# Patient Record
Sex: FEMALE | Race: OTHER | Employment: FULL TIME | URBAN - METROPOLITAN AREA
[De-identification: names, ages, dates, MRNs, and addresses within clinical notes are randomized per-mention and may not be internally consistent; named-entity substitution may affect disease eponyms.]

---

## 2020-02-20 ENCOUNTER — HOSPITAL ENCOUNTER (EMERGENCY)
Age: 20
Discharge: HOME | End: 2020-02-21
Attending: PEDIATRICS
Payer: COMMERCIAL

## 2020-02-20 DIAGNOSIS — K52.9 AGE (ACUTE GASTROENTERITIS): Primary | ICD-10-CM

## 2020-02-20 PROCEDURE — 85652 RBC SED RATE AUTOMATED: CPT

## 2020-02-20 PROCEDURE — 99284 EMERGENCY DEPT VISIT MOD MDM: CPT

## 2020-02-20 PROCEDURE — 86140 C-REACTIVE PROTEIN: CPT

## 2020-02-20 PROCEDURE — 81001 URINALYSIS AUTO W/SCOPE: CPT

## 2020-02-20 PROCEDURE — 87506 IADNA-DNA/RNA PROBE TQ 6-11: CPT

## 2020-02-20 PROCEDURE — 81025 URINE PREGNANCY TEST: CPT

## 2020-02-20 PROCEDURE — 96375 TX/PRO/DX INJ NEW DRUG ADDON: CPT

## 2020-02-20 PROCEDURE — 83690 ASSAY OF LIPASE: CPT

## 2020-02-20 PROCEDURE — 96361 HYDRATE IV INFUSION ADD-ON: CPT

## 2020-02-20 PROCEDURE — 85025 COMPLETE CBC W/AUTO DIFF WBC: CPT

## 2020-02-20 PROCEDURE — 96374 THER/PROPH/DIAG INJ IV PUSH: CPT

## 2020-02-20 PROCEDURE — 36415 COLL VENOUS BLD VENIPUNCTURE: CPT

## 2020-02-20 PROCEDURE — 80053 COMPREHEN METABOLIC PANEL: CPT

## 2020-02-20 RX ORDER — ONDANSETRON 2 MG/ML
4 INJECTION INTRAMUSCULAR; INTRAVENOUS
Status: COMPLETED | OUTPATIENT
Start: 2020-02-21 | End: 2020-02-21

## 2020-02-21 ENCOUNTER — APPOINTMENT (OUTPATIENT)
Dept: CT IMAGING | Age: 20
End: 2020-02-21
Attending: PEDIATRICS
Payer: COMMERCIAL

## 2020-02-21 ENCOUNTER — APPOINTMENT (OUTPATIENT)
Dept: GENERAL RADIOLOGY | Age: 20
End: 2020-02-21
Attending: PEDIATRICS
Payer: COMMERCIAL

## 2020-02-21 VITALS
SYSTOLIC BLOOD PRESSURE: 102 MMHG | OXYGEN SATURATION: 98 % | RESPIRATION RATE: 18 BRPM | HEART RATE: 96 BPM | WEIGHT: 269.62 LBS | TEMPERATURE: 98.2 F | DIASTOLIC BLOOD PRESSURE: 70 MMHG

## 2020-02-21 LAB
ALBUMIN SERPL-MCNC: 3.7 G/DL (ref 3.5–5)
ALBUMIN/GLOB SERPL: 0.8 {RATIO} (ref 1.1–2.2)
ALP SERPL-CCNC: 54 U/L (ref 45–117)
ALT SERPL-CCNC: 26 U/L (ref 12–78)
ANION GAP SERPL CALC-SCNC: 7 MMOL/L (ref 5–15)
APPEARANCE UR: CLEAR
AST SERPL-CCNC: 14 U/L (ref 15–37)
BACTERIA URNS QL MICRO: NEGATIVE /HPF
BASOPHILS # BLD: 0 K/UL (ref 0–0.1)
BASOPHILS NFR BLD: 0 % (ref 0–1)
BILIRUB SERPL-MCNC: 0.7 MG/DL (ref 0.2–1)
BILIRUB UR QL: NEGATIVE
BUN SERPL-MCNC: 9 MG/DL (ref 6–20)
BUN/CREAT SERPL: 11 (ref 12–20)
CALCIUM SERPL-MCNC: 8.9 MG/DL (ref 8.5–10.1)
CAMPYLOBACTER SPECIES, DNA: NEGATIVE
CHLORIDE SERPL-SCNC: 105 MMOL/L (ref 97–108)
CO2 SERPL-SCNC: 22 MMOL/L (ref 21–32)
COLOR UR: ABNORMAL
CREAT SERPL-MCNC: 0.79 MG/DL (ref 0.55–1.02)
CRP SERPL-MCNC: 11.4 MG/DL (ref 0–0.6)
DIFFERENTIAL METHOD BLD: ABNORMAL
ENTEROTOXIGEN E COLI, DNA: NEGATIVE
EOSINOPHIL # BLD: 0 K/UL (ref 0–0.4)
EOSINOPHIL NFR BLD: 0 % (ref 0–7)
EPITH CASTS URNS QL MICRO: ABNORMAL /LPF
ERYTHROCYTE [DISTWIDTH] IN BLOOD BY AUTOMATED COUNT: 14.3 % (ref 11.5–14.5)
ERYTHROCYTE [SEDIMENTATION RATE] IN BLOOD: 53 MM/HR (ref 0–20)
GLOBULIN SER CALC-MCNC: 4.7 G/DL (ref 2–4)
GLUCOSE SERPL-MCNC: 135 MG/DL (ref 65–100)
GLUCOSE UR STRIP.AUTO-MCNC: NEGATIVE MG/DL
HCG UR QL: NEGATIVE
HCG UR QL: NEGATIVE
HCT VFR BLD AUTO: 41.6 % (ref 35–47)
HGB BLD-MCNC: 13 G/DL (ref 11.5–16)
HGB UR QL STRIP: ABNORMAL
HYALINE CASTS URNS QL MICRO: ABNORMAL /LPF (ref 0–5)
IMM GRANULOCYTES # BLD AUTO: 0 K/UL (ref 0–0.04)
IMM GRANULOCYTES NFR BLD AUTO: 0 % (ref 0–0.5)
KETONES UR QL STRIP.AUTO: ABNORMAL MG/DL
LEUKOCYTE ESTERASE UR QL STRIP.AUTO: NEGATIVE
LIPASE SERPL-CCNC: 111 U/L (ref 73–393)
LYMPHOCYTES # BLD: 1.6 K/UL (ref 0.8–3.5)
LYMPHOCYTES NFR BLD: 15 % (ref 12–49)
MCH RBC QN AUTO: 23.6 PG (ref 26–34)
MCHC RBC AUTO-ENTMCNC: 31.3 G/DL (ref 30–36.5)
MCV RBC AUTO: 75.4 FL (ref 80–99)
MONOCYTES # BLD: 0.7 K/UL (ref 0–1)
MONOCYTES NFR BLD: 7 % (ref 5–13)
NEUTS SEG # BLD: 8.1 K/UL (ref 1.8–8)
NEUTS SEG NFR BLD: 78 % (ref 32–75)
NITRITE UR QL STRIP.AUTO: NEGATIVE
NRBC # BLD: 0 K/UL (ref 0–0.01)
NRBC BLD-RTO: 0 PER 100 WBC
P SHIGELLOIDES DNA STL QL NAA+PROBE: NEGATIVE
PH UR STRIP: 5.5 [PH] (ref 5–8)
PLATELET # BLD AUTO: 317 K/UL (ref 150–400)
PMV BLD AUTO: 10.1 FL (ref 8.9–12.9)
POTASSIUM SERPL-SCNC: 3.2 MMOL/L (ref 3.5–5.1)
PROT SERPL-MCNC: 8.4 G/DL (ref 6.4–8.2)
PROT UR STRIP-MCNC: ABNORMAL MG/DL
RBC # BLD AUTO: 5.52 M/UL (ref 3.8–5.2)
RBC #/AREA URNS HPF: ABNORMAL /HPF (ref 0–5)
SALMONELLA SPECIES, DNA: NEGATIVE
SHIGA TOXIN PRODUCING, DNA: NEGATIVE
SHIGELLA SP+EIEC IPAH STL QL NAA+PROBE: NEGATIVE
SODIUM SERPL-SCNC: 134 MMOL/L (ref 136–145)
SP GR UR REFRACTOMETRY: 1.02 (ref 1–1.03)
UR CULT HOLD, URHOLD: NORMAL
UROBILINOGEN UR QL STRIP.AUTO: 0.2 EU/DL (ref 0.2–1)
VIBRIO SPECIES, DNA: NEGATIVE
WBC # BLD AUTO: 10.5 K/UL (ref 3.6–11)
WBC URNS QL MICRO: ABNORMAL /HPF (ref 0–4)
Y. ENTEROCOLITICA, DNA: NEGATIVE

## 2020-02-21 PROCEDURE — 74011000258 HC RX REV CODE- 258: Performed by: RADIOLOGY

## 2020-02-21 PROCEDURE — 74011636320 HC RX REV CODE- 636/320: Performed by: RADIOLOGY

## 2020-02-21 PROCEDURE — 74177 CT ABD & PELVIS W/CONTRAST: CPT

## 2020-02-21 PROCEDURE — 74019 RADEX ABDOMEN 2 VIEWS: CPT

## 2020-02-21 PROCEDURE — 74011250636 HC RX REV CODE- 250/636: Performed by: PEDIATRICS

## 2020-02-21 PROCEDURE — 74011000250 HC RX REV CODE- 250: Performed by: PEDIATRICS

## 2020-02-21 PROCEDURE — 74011250637 HC RX REV CODE- 250/637: Performed by: PEDIATRICS

## 2020-02-21 PROCEDURE — C9113 INJ PANTOPRAZOLE SODIUM, VIA: HCPCS | Performed by: PEDIATRICS

## 2020-02-21 RX ORDER — SODIUM CHLORIDE 0.9 % (FLUSH) 0.9 %
10 SYRINGE (ML) INJECTION
Status: COMPLETED | OUTPATIENT
Start: 2020-02-21 | End: 2020-02-21

## 2020-02-21 RX ORDER — ACETAMINOPHEN 325 MG/1
650 TABLET ORAL
Status: COMPLETED | OUTPATIENT
Start: 2020-02-21 | End: 2020-02-21

## 2020-02-21 RX ORDER — LOPERAMIDE HYDROCHLORIDE 2 MG/1
2 CAPSULE ORAL
Qty: 20 CAP | Refills: 0 | Status: SHIPPED | OUTPATIENT
Start: 2020-02-21 | End: 2020-03-02

## 2020-02-21 RX ORDER — ONDANSETRON 4 MG/1
4 TABLET, ORALLY DISINTEGRATING ORAL
Qty: 10 TAB | Refills: 0 | Status: SHIPPED | OUTPATIENT
Start: 2020-02-21 | End: 2021-02-15

## 2020-02-21 RX ADMIN — ACETAMINOPHEN 650 MG: 325 TABLET ORAL at 00:44

## 2020-02-21 RX ADMIN — IOHEXOL 50 ML: 240 INJECTION, SOLUTION INTRATHECAL; INTRAVASCULAR; INTRAVENOUS; ORAL at 02:00

## 2020-02-21 RX ADMIN — SODIUM CHLORIDE 1000 ML: 900 INJECTION, SOLUTION INTRAVENOUS at 00:07

## 2020-02-21 RX ADMIN — SODIUM CHLORIDE 100 ML: 900 INJECTION, SOLUTION INTRAVENOUS at 02:56

## 2020-02-21 RX ADMIN — Medication 10 ML: at 02:56

## 2020-02-21 RX ADMIN — IOPAMIDOL 100 ML: 755 INJECTION, SOLUTION INTRAVENOUS at 02:56

## 2020-02-21 RX ADMIN — ONDANSETRON 4 MG: 2 INJECTION, SOLUTION INTRAMUSCULAR; INTRAVENOUS at 00:44

## 2020-02-21 RX ADMIN — SODIUM CHLORIDE 40 MG: 9 INJECTION INTRAMUSCULAR; INTRAVENOUS; SUBCUTANEOUS at 00:44

## 2020-02-21 NOTE — ED TRIAGE NOTES
Pt states she has had watery diarrhea X2 days. Vomiting yesterday. Pt states she has been having chills but no documented fever.

## 2020-02-21 NOTE — DISCHARGE INSTRUCTIONS
Gastroenteritis: Care Instructions  Your Care Instructions    Gastroenteritis is an illness that may cause nausea, vomiting, and diarrhea. It is sometimes called \"stomach flu. \" It can be caused by bacteria or a virus. You will probably begin to feel better in 1 to 2 days. In the meantime, get plenty of rest and make sure you do not become dehydrated. Dehydration occurs when your body loses too much fluid. Follow-up care is a key part of your treatment and safety. Be sure to make and go to all appointments, and call your doctor if you are having problems. It's also a good idea to know your test results and keep a list of the medicines you take. How can you care for yourself at home? · If your doctor prescribed antibiotics, take them as directed. Do not stop taking them just because you feel better. You need to take the full course of antibiotics. · Drink plenty of fluids to prevent dehydration, enough so that your urine is light yellow or clear like water. Choose water and other caffeine-free clear liquids until you feel better. If you have kidney, heart, or liver disease and have to limit fluids, talk with your doctor before you increase your fluid intake. · Drink fluids slowly, in frequent, small amounts, because drinking too much too fast can cause vomiting. · Begin eating mild foods, such as dry toast, yogurt, applesauce, bananas, and rice. Avoid spicy, hot, or high-fat foods, and do not drink alcohol or caffeine for a day or two. Do not drink milk or eat ice cream until you are feeling better. How to prevent gastroenteritis  · Keep hot foods hot and cold foods cold. · Do not eat meats, dressings, salads, or other foods that have been kept at room temperature for more than 2 hours. · Use a thermometer to check your refrigerator. It should be between 34°F and 40°F.  · Defrost meats in the refrigerator or microwave, not on the kitchen counter. · Keep your hands and your kitchen clean.  Wash your hands, cutting boards, and countertops with hot soapy water frequently. · Cook meat until it is well done. · Do not eat raw eggs or uncooked sauces made with raw eggs. · Do not take chances. If food looks or tastes spoiled, throw it out. When should you call for help? Call 911 anytime you think you may need emergency care. For example, call if:    · You vomit blood or what looks like coffee grounds.     · You passed out (lost consciousness).     · You pass maroon or very bloody stools.    Call your doctor now or seek immediate medical care if:    · You have severe belly pain.     · You have signs of needing more fluids. You have sunken eyes, a dry mouth, and pass only a little dark urine.     · You feel like you are going to faint.     · You have increased belly pain that does not go away in 1 to 2 days.     · You have new or increased nausea, or you are vomiting.     · You have a new or higher fever.     · Your stools are black and tarlike or have streaks of blood.    Watch closely for changes in your health, and be sure to contact your doctor if:    · You are dizzy or lightheaded.     · You urinate less than usual, or your urine is dark yellow or brown.     · You do not feel better with each day that goes by. Where can you learn more? Go to http://constance-martir.info/. Enter N142 in the search box to learn more about \"Gastroenteritis: Care Instructions. \"  Current as of: June 9, 2019  Content Version: 12.2  © 8797-6209 [employer]. Care instructions adapted under license by [employer] (which disclaims liability or warranty for this information). If you have questions about a medical condition or this instruction, always ask your healthcare professional. [address] any warranty or liability for your use of this information.

## 2020-02-21 NOTE — ED NOTES
Discharge instructions provided, pt verbalizes understanding. Tolerated PO. Reports feeling better. Stressed importance of handwashing with soap and water pt and father.

## 2020-02-21 NOTE — ED PROVIDER NOTES
The history is provided by the patient. Diarrhea    This is a new problem. The problem occurs constantly. The problem has been rapidly worsening (16 times today. Light green/brown and watery. no blood). The pain is associated with vomiting (Vomit x3-5 a day. NBNB). The pain is located in the epigastric region and generalized abdominal region. The quality of the pain is shooting and burning. The pain is moderate. Associated symptoms include anorexia, belching, diarrhea, flatus, nausea, vomiting and headaches (Hx of migraines and this feels like throbbing in frontal area). Pertinent negatives include no fever (Chills howeveer), no hematochezia, no melena, no constipation, no dysuria, no frequency, no hematuria, no myalgias, no chest pain and no back pain. Nothing worsens the pain. The pain is relieved by nothing. [facility] 6 weeks ago. NO other travel. NO vaginal complaints.  NO change in meds    IMM UTD  Past Medical History:   Diagnosis Date    Anxiety     Depression     Pre-diabetes        Past Surgical History:   Procedure Laterality Date    HX ADENOIDECTOMY      HX TONSIL AND ADENOIDECTOMY      HX TONSILLECTOMY           Family History:   Problem Relation Age of Onset    Hypertension Father     Diabetes Father         type 2    Diabetes Paternal Grandfather        Social History     Socioeconomic History    Marital status: SINGLE     Spouse name: Not on file    Number of children: Not on file    Years of education: Not on file    Highest education level: Not on file   Occupational History    Not on file   Social Needs    Financial resource strain: Not on file    Food insecurity:     Worry: Not on file     Inability: Not on file    Transportation needs:     Medical: Not on file     Non-medical: Not on file   Tobacco Use    Smoking status: Never Smoker    Smokeless tobacco: Never Used   Substance and Sexual Activity    Alcohol use: No    Drug use: No    Sexual activity: Never   Lifestyle  Physical activity:     Days per week: Not on file     Minutes per session: Not on file    Stress: Not on file   Relationships    Social connections:     Talks on phone: Not on file     Gets together: Not on file     Attends Evangelical service: Not on file     Active member of club or organization: Not on file     Attends meetings of clubs or organizations: Not on file     Relationship status: Not on file    Intimate partner violence:     Fear of current or ex partner: Not on file     Emotionally abused: Not on file     Physically abused: Not on file     Forced sexual activity: Not on file   Other Topics Concern    Not on file   Social History Narrative    ** Merged History Encounter **              ALLERGIES: Other medication    Review of Systems   Constitutional: Positive for chills. Negative for fever (Chills howeveer). HENT: Negative for congestion, ear pain, rhinorrhea, sinus pain, sore throat and trouble swallowing. Eyes: Negative for photophobia and redness. Respiratory: Negative for cough, choking, shortness of breath and stridor. Cardiovascular: Negative for chest pain and leg swelling. Gastrointestinal: Positive for abdominal pain, anorexia, diarrhea, flatus, nausea and vomiting. Negative for abdominal distention, blood in stool, constipation, hematochezia and melena. Endocrine: Negative for polydipsia. Genitourinary: Negative for dysuria, flank pain, frequency, hematuria, vaginal bleeding, vaginal discharge and vaginal pain. Musculoskeletal: Negative for back pain, myalgias, neck pain and neck stiffness. Skin: Negative for rash. Allergic/Immunologic: Negative for immunocompromised state. Neurological: Positive for headaches (Hx of migraines and this feels like throbbing in frontal area). Hematological: Does not bruise/bleed easily. Psychiatric/Behavioral: Negative for confusion.        Vitals:    02/20/20 2242 02/20/20 2324   BP: (!) 126/96    Pulse: (!) 119 (!) 117 Resp: 20 19   Temp: 100 °F (37.8 °C) 100.1 °F (37.8 °C)   SpO2: 99% 98%   Weight: 122.3 kg (269 lb 10 oz)             Physical Exam   Physical Exam   Constitutional: Appears well-developed and well-nourished. active. No distress. HENT:   Head: NCAT  Ears: Right Ear: Tympanic membrane normal. Left Ear: Tympanic membrane normal.   Nose: Nose normal. No nasal discharge. Mouth/Throat: Mucous membranes are moist. Pharynx is dry   Eyes: Conjunctivae are normal. Right eye exhibits no discharge. Left eye exhibits no discharge. Neck: Normal range of motion. Neck supple. Cardiovascular: Normal rate, regular rhythm, S1 normal and S2 normal. No murmur   2+ distal pulses   Pulmonary/Chest: Effort normal and breath sounds normal. No nasal flaring or stridor. No respiratory distress. no wheezes. no rhonchi. no rales. no retraction. Abdominal: Soft. obese. Mild generalized tenderness, more In epigastric area. NO rebound and  no guarding. No hernia. No masses or HSM. Neg [place] sign  Musculoskeletal: Normal range of motion. no edema, no tenderness, no deformity and no signs of injury. Lymphadenopathy:     no cervical adenopathy. Neurological:  alert. normal strength. normal muscle tone. No focal defecits  Skin: Skin is warm and dry. Capillary refill takes less than 3 seconds. Turgor is normal. No petechiae, no purpura and no rash noted. No cyanosis. MDM     Patient is well hydrated, well appearing, and in no respiratory distress. Physical exam is reassuring, and without signs of serious illness. Given the patient's history, clinical course, physical exam, labs and imaging findings, abdominal pain is unlikely secondary to a surgical etiology.      Recent Results (from the past 24 hour(s))   URINALYSIS W/MICROSCOPIC    Collection Time: 02/20/20 11:52 PM   Result Value Ref Range    Color YELLOW/STRAW      Appearance CLEAR CLEAR      Specific gravity 1.025 1.003 - 1.030      pH (UA) 5.5 5.0 - 8.0      Protein TRACE (A) NEG mg/dL    Glucose NEGATIVE  NEG mg/dL    Ketone TRACE (A) NEG mg/dL    Bilirubin NEGATIVE  NEG      Blood TRACE (A) NEG      Urobilinogen 0.2 0.2 - 1.0 EU/dL    Nitrites NEGATIVE  NEG      Leukocyte Esterase NEGATIVE  NEG      WBC 0-4 0 - 4 /hpf    RBC 5-10 0 - 5 /hpf    Epithelial cells MODERATE (A) FEW /lpf    Bacteria NEGATIVE  NEG /hpf    Hyaline cast 2-5 0 - 5 /lpf   URINE CULTURE HOLD SAMPLE    Collection Time: 02/20/20 11:52 PM   Result Value Ref Range    Urine culture hold        Urine on hold in Microbiology dept for 2 days. If unpreserved urine is submitted, it cannot be used for addtional testing after 24 hours, recollection will be required. HCG URINE, QL    Collection Time: 02/20/20 11:52 PM   Result Value Ref Range    HCG urine, QL NEGATIVE  NEG     CBC WITH AUTOMATED DIFF    Collection Time: 02/20/20 11:52 PM   Result Value Ref Range    WBC 10.5 3.6 - 11.0 K/uL    RBC 5.52 (H) 3.80 - 5.20 M/uL    HGB 13.0 11.5 - 16.0 g/dL    HCT 41.6 35.0 - 47.0 %    MCV 75.4 (L) 80.0 - 99.0 FL    MCH 23.6 (L) 26.0 - 34.0 PG    MCHC 31.3 30.0 - 36.5 g/dL    RDW 14.3 11.5 - 14.5 %    PLATELET 092 878 - 704 K/uL    MPV 10.1 8.9 - 12.9 FL    NRBC 0.0 0  WBC    ABSOLUTE NRBC 0.00 0.00 - 0.01 K/uL    NEUTROPHILS 78 (H) 32 - 75 %    LYMPHOCYTES 15 12 - 49 %    MONOCYTES 7 5 - 13 %    EOSINOPHILS 0 0 - 7 %    BASOPHILS 0 0 - 1 %    IMMATURE GRANULOCYTES 0 0.0 - 0.5 %    ABS. NEUTROPHILS 8.1 (H) 1.8 - 8.0 K/UL    ABS. LYMPHOCYTES 1.6 0.8 - 3.5 K/UL    ABS. MONOCYTES 0.7 0.0 - 1.0 K/UL    ABS. EOSINOPHILS 0.0 0.0 - 0.4 K/UL    ABS. BASOPHILS 0.0 0.0 - 0.1 K/UL    ABS. IMM.  GRANS. 0.0 0.00 - 0.04 K/UL    DF AUTOMATED     METABOLIC PANEL, COMPREHENSIVE    Collection Time: 02/20/20 11:52 PM   Result Value Ref Range    Sodium 134 (L) 136 - 145 mmol/L    Potassium 3.2 (L) 3.5 - 5.1 mmol/L    Chloride 105 97 - 108 mmol/L    CO2 22 21 - 32 mmol/L    Anion gap 7 5 - 15 mmol/L    Glucose 135 (H) 65 - 100 mg/dL    BUN 9 6 - 20 MG/DL    Creatinine 0.79 0.55 - 1.02 MG/DL    BUN/Creatinine ratio 11 (L) 12 - 20      GFR est AA >60 >60 ml/min/1.73m2    GFR est non-AA >60 >60 ml/min/1.73m2    Calcium 8.9 8.5 - 10.1 MG/DL    Bilirubin, total 0.7 0.2 - 1.0 MG/DL    ALT (SGPT) 26 12 - 78 U/L    AST (SGOT) 14 (L) 15 - 37 U/L    Alk. phosphatase 54 45 - 117 U/L    Protein, total 8.4 (H) 6.4 - 8.2 g/dL    Albumin 3.7 3.5 - 5.0 g/dL    Globulin 4.7 (H) 2.0 - 4.0 g/dL    A-G Ratio 0.8 (L) 1.1 - 2.2     LIPASE    Collection Time: 02/20/20 11:52 PM   Result Value Ref Range    Lipase 111 73 - 393 U/L   C REACTIVE PROTEIN, QT    Collection Time: 02/20/20 11:52 PM   Result Value Ref Range    C-Reactive protein 11.40 (H) 0.00 - 0.60 mg/dL   SED RATE (ESR)    Collection Time: 02/20/20 11:52 PM   Result Value Ref Range    Sed rate, automated 53 (H) 0 - 20 mm/hr   HCG URINE, QL. - POC    Collection Time: 02/21/20 12:34 AM   Result Value Ref Range    Pregnancy test,urine (POC) NEGATIVE  NEG         Xr Abd Flat/ Erect    Result Date: 2/21/2020  INDICATION: Abdominal pain FINDINGS: Upright and supine views of the abdomen demonstrate a nonobstructive bowel gas pattern. There is no intraperitoneal free air. No soft tissue mass or pathological soft tissue calcification is seen. Intrauterine device overlies the pelvis. The osseous structures are unremarkable. IMPRESSION: No evidence of acute abdominal process. Ct Abd Pelv W Cont    Result Date: 2/21/2020  EXAM: CT ABD PELV W CONT INDICATION: Abdominal pain COMPARISON: None CONTRAST: 100 mL of Isovue-370. TECHNIQUE: Following the uneventful intravenous administration of contrast, thin axial images were obtained through the abdomen and pelvis. Coronal and sagittal reconstructions were generated. Oral contrast was administered. CT dose reduction was achieved through use of a standardized protocol tailored for this examination and automatic exposure control for dose modulation. FINDINGS: LUNG BASES: Clear. INCIDENTALLY IMAGED HEART AND MEDIASTINUM: Unremarkable. LIVER: No mass or biliary dilatation. GALLBLADDER: Unremarkable. SPLEEN: No mass. PANCREAS: No mass or ductal dilatation. ADRENALS: Unremarkable. KIDNEYS: No mass, calculus, or hydronephrosis. STOMACH: Unremarkable. SMALL BOWEL: No dilatation or wall thickening. COLON: No dilatation or wall thickening. APPENDIX: Normal pre- PERITONEUM: No ascites or pneumoperitoneum. RETROPERITONEUM: No lymphadenopathy or aortic aneurysm. REPRODUCTIVE ORGANS: Intrauterine device within the uterus. URINARY BLADDER: No mass or calculus. BONES: No destructive bone lesion. ADDITIONAL COMMENTS: N/A     IMPRESSION: No acute abdominal or pelvic process. CT done as ESR up and Hx of IBS reported. CT normal. Patient will be discharged home with pain control and follow-up with primary care physician in one to two days. Patient and caregivers were instructed on signs and symptoms of reasons to return including fever, worsening pain, vomiting, blood in the stool or any other concerns. ICD-10-CM ICD-9-CM   1. AGE (acute gastroenteritis) K52.9 558.9       Current Discharge Medication List      START taking these medications    Details   ondansetron (ZOFRAN ODT) 4 mg disintegrating tablet Take 1 Tab by mouth every eight (8) hours as needed for Nausea for up to 360 days. Qty: 10 Tab, Refills: 0      Lactobacillus acidophilus (PROBIOTIC) 10 billion cell cap Take 1 Tab by mouth daily. Qty: 14 Cap, Refills: 0      loperamide (IMODIUM) 2 mg capsule Take 1 Cap by mouth four (4) times daily as needed for Diarrhea for up to 10 days.  Take 4 mg for 1st dose  Qty: 20 Cap, Refills: 0             Follow-up Information     Follow up With Specialties Details Why Contact Info    Sujata Llamas MD Internal Medicine In 2 days  [address]  [phone]      Harshal Lion MD Gastroenterology, Gastroenterology  If symptoms worsen, As needed [address] [facility] [address] [facility]  [phone]            The patient verbalized understanding. 3:36 AM  Baldemar Contreras M.D.     Procedures

## 2020-02-21 NOTE — ED NOTES
Finished drinking oral contrast. Pt reports feeling better. No vomiting or diarrhea since assumption of care. Father at bedside.

## 2022-01-05 ENCOUNTER — HOSPITAL ENCOUNTER (EMERGENCY)
Age: 22
Discharge: LEFT BEFORE DISCHARGE | End: 2022-01-06
Attending: EMERGENCY MEDICINE
Payer: COMMERCIAL

## 2022-01-05 VITALS
TEMPERATURE: 98 F | RESPIRATION RATE: 18 BRPM | SYSTOLIC BLOOD PRESSURE: 146 MMHG | HEART RATE: 110 BPM | OXYGEN SATURATION: 97 % | DIASTOLIC BLOOD PRESSURE: 93 MMHG

## 2022-01-05 PROCEDURE — 75810000275 HC EMERGENCY DEPT VISIT NO LEVEL OF CARE

## 2022-01-06 ENCOUNTER — APPOINTMENT (OUTPATIENT)
Dept: ULTRASOUND IMAGING | Age: 22
End: 2022-01-06
Attending: PHYSICIAN ASSISTANT
Payer: COMMERCIAL

## 2022-01-06 ENCOUNTER — HOSPITAL ENCOUNTER (EMERGENCY)
Age: 22
Discharge: HOME | End: 2022-01-06
Attending: EMERGENCY MEDICINE
Payer: COMMERCIAL

## 2022-01-06 VITALS
OXYGEN SATURATION: 99 % | SYSTOLIC BLOOD PRESSURE: 146 MMHG | TEMPERATURE: 97 F | DIASTOLIC BLOOD PRESSURE: 93 MMHG | RESPIRATION RATE: 16 BRPM | HEART RATE: 86 BPM

## 2022-01-06 DIAGNOSIS — L05.01 PILONIDAL ABSCESS OF NATAL CLEFT: Primary | ICD-10-CM

## 2022-01-06 PROCEDURE — 76857 US EXAM PELVIC LIMITED: CPT

## 2022-01-06 PROCEDURE — 74011000250 HC RX REV CODE- 250: Performed by: PHYSICIAN ASSISTANT

## 2022-01-06 PROCEDURE — 75810000462 HC INC/DRN PILONIDAL CYST SIMPLE LVL 1 5051

## 2022-01-06 PROCEDURE — 74011250637 HC RX REV CODE- 250/637: Performed by: PHYSICIAN ASSISTANT

## 2022-01-06 PROCEDURE — 99282 EMERGENCY DEPT VISIT SF MDM: CPT

## 2022-01-06 RX ORDER — AMOXICILLIN AND CLAVULANATE POTASSIUM 875; 125 MG/1; MG/1
1 TABLET, FILM COATED ORAL
Status: COMPLETED | OUTPATIENT
Start: 2022-01-06 | End: 2022-01-06

## 2022-01-06 RX ORDER — LIDOCAINE HYDROCHLORIDE 10 MG/ML
INJECTION INFILTRATION; PERINEURAL
Status: DISCONTINUED
Start: 2022-01-06 | End: 2022-01-07 | Stop reason: HOSPADM

## 2022-01-06 RX ORDER — ALPRAZOLAM 0.5 MG/1
1 TABLET ORAL
Status: COMPLETED | OUTPATIENT
Start: 2022-01-06 | End: 2022-01-06

## 2022-01-06 RX ORDER — AMOXICILLIN AND CLAVULANATE POTASSIUM 875; 125 MG/1; MG/1
1 TABLET, FILM COATED ORAL 2 TIMES DAILY
Qty: 10 TABLET | Refills: 0 | Status: SHIPPED | OUTPATIENT
Start: 2022-01-06 | End: 2022-01-11

## 2022-01-06 RX ORDER — LIDOCAINE HYDROCHLORIDE 10 MG/ML
10 INJECTION, SOLUTION EPIDURAL; INFILTRATION; INTRACAUDAL; PERINEURAL ONCE
Status: COMPLETED | OUTPATIENT
Start: 2022-01-06 | End: 2022-01-06

## 2022-01-06 RX ORDER — HYDROCODONE BITARTRATE AND ACETAMINOPHEN 5; 325 MG/1; MG/1
1 TABLET ORAL
Status: COMPLETED | OUTPATIENT
Start: 2022-01-06 | End: 2022-01-06

## 2022-01-06 RX ORDER — HYDROCODONE BITARTRATE AND ACETAMINOPHEN 5; 325 MG/1; MG/1
1 TABLET ORAL
Qty: 12 TABLET | Refills: 0 | Status: SHIPPED | OUTPATIENT
Start: 2022-01-06 | End: 2022-01-08

## 2022-01-06 RX ORDER — LIDOCAINE HYDROCHLORIDE AND EPINEPHRINE 10; 10 MG/ML; UG/ML
10 INJECTION, SOLUTION INFILTRATION; PERINEURAL ONCE
Status: DISCONTINUED | OUTPATIENT
Start: 2022-01-06 | End: 2022-01-07 | Stop reason: HOSPADM

## 2022-01-06 RX ADMIN — HYDROCODONE BITARTRATE AND ACETAMINOPHEN 1 TABLET: 5; 325 TABLET ORAL at 21:19

## 2022-01-06 RX ADMIN — LIDOCAINE HYDROCHLORIDE 10 ML: 10 INJECTION, SOLUTION EPIDURAL; INFILTRATION; INTRACAUDAL; PERINEURAL at 21:17

## 2022-01-06 RX ADMIN — ALPRAZOLAM 1 MG: 0.5 TABLET ORAL at 21:19

## 2022-01-06 RX ADMIN — AMOXICILLIN AND CLAVULANATE POTASSIUM 1 TABLET: 875; 125 TABLET, FILM COATED ORAL at 22:50

## 2022-01-06 NOTE — ED PROVIDER NOTES
24year old F presenting to the ED for likely cyst.  Reports some discomfort \"at the end of my buttcrack, my tailbone\" she had some soreness. Worse in the last couple of days. Concerned that she may have a pilonidal abscess. No fever or chills. Is unsure if she may have had one of these in the past - had a \"bump\" that went away. PMHx: anxiety, depression, bipolar, Type II DM  PSx: T&A  Social: non-smoker. Occasional cannabis. Social alcohol. Works as a  for [employer]      Back Pain  Pertinent negatives include no chest pain and no shortness of breath. Cyst  Pertinent negatives include no chest pain and no shortness of breath. Past Medical History:   Diagnosis Date    Anxiety     Depression     Diabetes ([facility] [address]        Past Surgical History:   Procedure Laterality Date    HX ADENOIDECTOMY      HX TONSIL AND ADENOIDECTOMY      HX TONSILLECTOMY           Family History:   Problem Relation Age of Onset    Hypertension Father     Diabetes Father         type 2    Diabetes Paternal Grandfather        Social History     Socioeconomic History    Marital status: SINGLE     Spouse name: Not on file    Number of children: Not on file    Years of education: Not on file    Highest education level: Not on file   Occupational History    Not on file   Tobacco Use    Smoking status: Never Smoker    Smokeless tobacco: Never Used   Substance and Sexual Activity    Alcohol use: No    Drug use: No    Sexual activity: Never   Other Topics Concern    Not on file   Social History Narrative    ** Merged History Encounter **          Social Determinants of Health     Financial Resource Strain:     Difficulty of Paying Living Expenses: Not on file   Food Insecurity:     Worried About Running Out of Food in the Last Year: Not on file    Jeff of Food in the Last Year: Not on file   Transportation Needs:     Lack of Transportation (Medical):  Not on file    Lack of Transportation (Non-Medical): Not on file   Physical Activity:     Days of Exercise per Week: Not on file    Minutes of Exercise per Session: Not on file   Stress:     Feeling of Stress : Not on file   Social Connections:     Frequency of Communication with Friends and Family: Not on file    Frequency of Social Gatherings with Friends and Family: Not on file    Attends Sabianist Services: Not on file    Active Member of [address] [place] [address] or Organizations: Not on file    Attends Club or Organization Meetings: Not on file    Marital Status: Not on file   Intimate Partner Violence:     Fear of Current or Ex-Partner: Not on file    Emotionally Abused: Not on file    Physically Abused: Not on file    Sexually Abused: Not on file   Housing Stability:     Unable to Pay for Housing in the Last Year: Not on file    Number of [place] in the Last Year: Not on file    Unstable Housing in the Last Year: Not on file         ALLERGIES: Other medication    Review of Systems   Constitutional: Negative for fever. HENT: Negative for facial swelling. Respiratory: Negative for shortness of breath. Cardiovascular: Negative for chest pain. Gastrointestinal: Negative for vomiting. Musculoskeletal: Positive for back pain. Skin: Negative for wound. Neurological: Negative for syncope. All other systems reviewed and are negative. Vitals:    01/06/22 1742   BP: (!) 146/93   Pulse: 86   Resp: 16   Temp: 97 °F (36.1 °C)   SpO2: 99%            Physical Exam  Vitals and nursing note reviewed. Constitutional:       General: She is not in acute distress. Appearance: She is well-developed. Comments: Pleasant, well-appearing, no distress   HENT:      Head: Normocephalic and atraumatic. Right Ear: External ear normal.      Left Ear: External ear normal.   Eyes:      General: No scleral icterus. Conjunctiva/sclera: Conjunctivae normal.   Neck:      Trachea: No tracheal deviation.    Cardiovascular:      Rate and Rhythm: Normal rate and regular rhythm. Heart sounds: Normal heart sounds. No murmur heard. No friction rub. No gallop. Pulmonary:      Effort: Pulmonary effort is normal. No respiratory distress. Breath sounds: Normal breath sounds. No stridor. No wheezing. Abdominal:      General: There is no distension. Palpations: Abdomen is soft. Genitourinary:     Comments: Cleft: No swelling or erythema, very mild tenderness to deep palpation, no appreciable fluctuance  Musculoskeletal:         General: Normal range of motion. Cervical back: Neck supple. Skin:     General: Skin is warm and dry. Neurological:      Mental Status: She is alert and oriented to person, place, and time. Psychiatric:         Behavior: Behavior normal.          MDM  Number of Diagnoses or Management Options  Pilonidal abscess of  cleft  Diagnosis management comments: 74-year-old female presenting to the ED for pain at the  cleft, does note that it has been so significant that she has really been unable to sit down. Exam was not entirely unremarkable, no obvious abscess or fluctuance, however ultrasound showed a relatively large pocket of fluid. I&D was performed and copious purulent drainage was expressed, discussed use of antibiotics, warm compresses and sitz bath, general surgery follow-up. [facility]    Date/Time: 2022 10:27 PM  Performed by: STEW Bashir  Authorized by: STEW Bashir     Consent:     Consent obtained:  Verbal    Consent given by:  Patient  Location:     Type:  Pilonidal cyst    Size:  See US report    Location: elisha cleft. Pre-procedure details:     Skin preparation:  Betadine  Anesthesia (see MAR for exact dosages):      Anesthesia method:  Local infiltration    Local anesthetic:  Lidocaine 1% w/o epi  Procedure type:     Complexity:  Complex  Procedure details:     Needle aspiration: no      Incision types:  Single straight    Incision depth: Subcutaneous    Scalpel blade:  11    Wound management:  Probed and deloculated    Drainage:  Purulent and bloody    Drainage amount:  Copious    Wound treatment:  Wound left open    Packing materials:  None  Post-procedure details:     Patient tolerance of procedure:   Tolerated well, no immediate complications

## 2022-01-06 NOTE — ED TRIAGE NOTES
Pt reports she has a Hx of HS. Pt reports the last few days she has been having pain in her tailbone area.  Pt reports she is worried she has a pilonidal cyst.     Pt denies fall, injury

## 2022-01-07 NOTE — DISCHARGE INSTRUCTIONS
Return for new or worsening symptoms. Call and make a follow-up appointment with the general surgery clinic, explained to them that you were seen in the emergency department and have a pilonidal abscess which was drained. You may use over the counter ibuprofen (Motrin, Advil, Ibuprofen), naproxen (Aleve), or acetaminophen (Tylenol) at home as needed for mild pain, and prescribed pain medication for moderate to severe pain. Take prescribed pain medication with caution, as it may cause drowsiness. Do not take with alcohol, and do not take if you are going to be driving. Additionally, if the medication you are prescribed has Tylenol (acetaminophen)  in it, do not take additional Tylenol within 4 hours as you may ingest too much.

## 2022-01-13 ENCOUNTER — TELEPHONE (OUTPATIENT)
Dept: SURGERY | Age: 22
End: 2022-01-13

## 2022-01-13 NOTE — TELEPHONE ENCOUNTER
Called and spoke with PT, verified  gave her a friendly reminder of her upcoming appointment for tomorrow. PT stated she had called [facility]'s number to schedule. I stated that the call may have gotten transferred, but your appointment is here at the [facility] location and that I was the one who made your appointment. PT placed me on hold while she made a call to make sure she had transportation for tomorrows appointment. PT confirmed that she will be here tomorrow for her 11:00 appointment and inquired about arrival time for paperwork-  I stated to her that we like for our PT'S to arrive about 20 minutes early to complete paperwork. PT is aware of location as she asked for our address. PT was very appreciative and I ended the call with we will see you tomorrow and have a great night.

## 2022-01-14 ENCOUNTER — OFFICE VISIT (OUTPATIENT)
Dept: SURGERY | Age: 22
End: 2022-01-14
Payer: COMMERCIAL

## 2022-01-14 VITALS
HEIGHT: 66 IN | WEIGHT: 266 LBS | SYSTOLIC BLOOD PRESSURE: 132 MMHG | OXYGEN SATURATION: 98 % | BODY MASS INDEX: 42.75 KG/M2 | DIASTOLIC BLOOD PRESSURE: 83 MMHG | RESPIRATION RATE: 20 BRPM | HEART RATE: 110 BPM | TEMPERATURE: 98.3 F

## 2022-01-14 DIAGNOSIS — L05.01 PILONIDAL ABSCESS: ICD-10-CM

## 2022-01-14 PROBLEM — G43.909 MIGRAINES: Status: ACTIVE | Noted: 2022-01-14

## 2022-01-14 PROBLEM — E11.9 TYPE II DIABETES MELLITUS (HCC): Status: ACTIVE | Noted: 2022-01-14

## 2022-01-14 PROBLEM — E66.01 MORBID OBESITY WITH BMI OF 40.0-44.9, ADULT (HCC): Status: ACTIVE | Noted: 2022-01-14

## 2022-01-14 PROBLEM — F41.9 ANXIETY: Status: ACTIVE | Noted: 2022-01-14

## 2022-01-14 PROBLEM — F32.A DEPRESSION: Status: ACTIVE | Noted: 2022-01-14

## 2022-01-14 PROCEDURE — 99203 OFFICE O/P NEW LOW 30 MIN: CPT | Performed by: SURGERY

## 2022-01-14 RX ORDER — LAMOTRIGINE 100 MG/1
TABLET ORAL DAILY
COMMUNITY
Start: 2021-12-10

## 2022-01-14 RX ORDER — LAMOTRIGINE 25 MG/1
TABLET ORAL DAILY
COMMUNITY
Start: 2021-12-10

## 2022-01-14 NOTE — PROGRESS NOTES
1. Have you been to the ER, urgent care clinic since your last visit? Hospitalized since your last visit? New Patient - ER referred on 01/06     2. Have you seen or consulted any other health care providers outside of the [address] [place] [address] since your last visit? Include any pap smears or colon screening.  No

## 2022-01-14 NOTE — PROGRESS NOTES
Surgery History and Physical    Subjective:      Julieta Kessler is [place] 24 [place] [facility] [place] who presents for evaluation of a pilonidal abscess. About 2 weeks ago, Ms. Kessler developed pain and swelling at her tailbone which made it difficult to sit. She was seen in the ER 8 days ago and had a pilonidal abscess per US. An I&D was performed with resolution of the abscess and her symptoms. She denies any pain or drainage presently. Past Medical History:   Diagnosis Date    Anxiety     Depression     Diabetes ([address]     Migraines     Morbid obesity with BMI of 40.0-44.9, adult ([facility])     Sleep apnea      Past Surgical History:   Procedure Laterality Date    HX ADENOIDECTOMY      HX TONSILLECTOMY      HX WISDOM TEETH EXTRACTION        Family History   Problem Relation Age of Onset    Hypertension Father     Diabetes Father         type 2    Diabetes Paternal Grandfather      Social History     Tobacco Use    Smoking status: Never Smoker    Smokeless tobacco: Never Used   Substance Use Topics    Alcohol use: No      Prior to Admission medications    Medication Sig Start Date End Date Taking? Authorizing Provider   lamoTRIgine (LaMICtal) 25 mg tablet Take  by mouth daily. As directed 12/10/21  Yes Provider, Historical   lamoTRIgine (LaMICtal) 100 mg tablet Take  by mouth daily. As directed 12/10/21  Yes Provider, Historical   risperiDONE (RISPERDAL) 0.25 mg tablet Take 0.25 mg by mouth daily. Yes Provider, Historical   metFORMIN (GLUCOPHAGE) 1,000 mg tablet Take 1,000 mg by mouth two (2) times daily (with meals). Yes Provider, Historical   multivitamin (ONE A DAY) tablet Take 1 Tab by mouth daily. Yes Provider, Historical   Lactobacillus acidophilus (PROBIOTIC) 10 billion cell cap Take 1 Tab by mouth daily. Patient not taking: Reported on 1/14/2022 2/21/20   Williams Kramer MD   citalopram (CELEXA) 40 mg tablet Take 40 mg by mouth daily.   Patient not taking: Reported on 1/14/2022 Provider, Historical   buPROPion XL (WELLBUTRIN XL) 150 mg tablet Take 150 mg by mouth every morning. Patient not taking: Reported on 1/14/2022    Provider, Historical   ranitidine (ZANTAC) 75 mg tablet Take 1 Tab by mouth two (2) times a day. Patient not taking: Reported on 1/14/2022 6/25/15   Dino Gomez MD   hyoscyamine SL (LEVSIN/SL) 0.125 mg SL tablet 0.125 mg by SubLINGual route every four (4) hours as needed for Cramping. Patient not taking: Reported on 1/14/2022    Other, MD Branedn   citalopram (CELEXA) 40 mg tablet Take 40 mg by mouth daily. Patient not taking: Reported on 1/14/2022    Provider, Historical   busPIRone (BUSPAR) 10 mg tablet Take 10 mg by mouth three (3) times daily. Patient not taking: Reported on 1/14/2022    Provider, Historical   metFORMIN (GLUCOPHAGE) 500 mg tablet Take  by mouth two (2) times daily (with meals). Patient not taking: Reported on 1/14/2022    Provider, Historical   cholecalciferol (VITAMIN D3) 1,000 unit tablet Take  by mouth daily. Patient not taking: Reported on 1/14/2022    Provider, Historical   ascorbic acid (VITAMIN C) 500 mg tablet Take  by mouth. Patient not taking: Reported on 1/14/2022    Provider, Historical   DULOXETINE HCL (CYMBALTA PO) Take 60 mg by mouth. Patient not taking: Reported on 1/14/2022    Provider, Historical   risperiDONE (RISPERDAL) 0.25 mg tablet Take 0.5 mg by mouth nightly. Patient not taking: Reported on 1/14/2022    Provider, Historical      Allergies   Allergen Reactions    Other Medication Cough     dust       Review of Systems:  A comprehensive review of systems was negative except for that written in the History of Present Illness.     Objective:      Physical Exam:  GENERAL: alert, cooperative, no distress, appears stated age, EYE: negative findings: anicteric sclera, LYMPHATIC: Cervical, supraclavicular nodes normal. , THROAT & NECK: normal, LUNG: clear to auscultation bilaterally, HEART: regular rate and rhythm, ABDOMEN: Soft, NT, ND., EXTREMITIES:  no edema, SKIN: Along the gluteal cleft, there is no erythema, fluctuance, tenderness, or drainage., NEUROLOGIC: negative, PSYCHIATRIC: non focal    Assessment:     Pilonidal abscess, resolved. Plan:     Ms. Megan Dugan has no further signs of infection; therefore, no further treatment is warranted. She can f/u in the future should she have any recurring symptoms.

## 2022-03-18 PROBLEM — L05.01 PILONIDAL ABSCESS: Status: ACTIVE | Noted: 2022-01-14

## 2022-03-19 PROBLEM — E66.01 MORBID OBESITY WITH BMI OF 40.0-44.9, ADULT (HCC): Status: ACTIVE | Noted: 2022-01-14

## 2022-03-19 PROBLEM — G43.909 MIGRAINES: Status: ACTIVE | Noted: 2022-01-14

## 2022-03-19 PROBLEM — F32.A DEPRESSION: Status: ACTIVE | Noted: 2022-01-14

## 2022-03-19 PROBLEM — F41.9 ANXIETY: Status: ACTIVE | Noted: 2022-01-14

## 2022-03-20 PROBLEM — E11.9 TYPE II DIABETES MELLITUS (HCC): Status: ACTIVE | Noted: 2022-01-14

## 2023-05-16 RX ORDER — BUPROPION HYDROCHLORIDE 150 MG/1
150 TABLET ORAL
COMMUNITY

## 2023-05-16 RX ORDER — RANITIDINE HCL 75 MG
75 TABLET ORAL 2 TIMES DAILY
COMMUNITY
Start: 2015-06-25

## 2023-05-16 RX ORDER — LAMOTRIGINE 100 MG/1
TABLET ORAL DAILY
COMMUNITY
Start: 2021-12-10

## 2023-05-16 RX ORDER — LAMOTRIGINE 25 MG/1
TABLET ORAL DAILY
COMMUNITY
Start: 2021-12-10

## 2023-05-16 RX ORDER — RISPERIDONE 0.25 MG/1
0.5 TABLET ORAL NIGHTLY
COMMUNITY

## 2023-05-16 RX ORDER — CITALOPRAM 40 MG/1
40 TABLET ORAL DAILY
COMMUNITY

## 2023-05-16 RX ORDER — ASCORBIC ACID 500 MG
TABLET ORAL
COMMUNITY

## 2023-05-16 RX ORDER — HYOSCYAMINE SULFATE 0.12 MG/1
0.12 TABLET SUBLINGUAL EVERY 4 HOURS PRN
COMMUNITY

## 2023-05-16 RX ORDER — BUSPIRONE HYDROCHLORIDE 10 MG/1
10 TABLET ORAL 3 TIMES DAILY
COMMUNITY